# Patient Record
Sex: FEMALE | URBAN - METROPOLITAN AREA
[De-identification: names, ages, dates, MRNs, and addresses within clinical notes are randomized per-mention and may not be internally consistent; named-entity substitution may affect disease eponyms.]

---

## 2024-08-01 ENCOUNTER — TELEPHONE (OUTPATIENT)
Dept: BARIATRICS/WEIGHT MGMT | Age: 54
End: 2024-08-01

## 2024-08-01 NOTE — TELEPHONE ENCOUNTER
Past bariatric surgery: Yes  What bariatric surgery has patient had:  bypass  What year did patient have surgery: 1997? Dr. Astudillo at [facility].  2nd surgery done in 2017 to do a reverse and couldn't because they found a bezoar. Repaired bezoar and proceeded to reverse. Stomach is no stapled to liver. Ruptured bowel and intestines in process. Dr. Shawn Maldonado in [place], [place] at [facility]  Location and provider of surgery: See above  What kind of complications are you having?  GERD, Nausea, weight Gain and EXTREME pain.  If GERD, have you seen a GI provider to help resolve the GERD issues? Yes Sees GI team [facility]  Have you been taking any medications for GERD? Yes Dexalint 30mg 1-2 times a day  Have you had an Upper GI or an EGD done? Yes last UGI was in 2022?    In a lot of pain and saw the show and would love your help     Has [employer] Medicare and has already checked and can see Dr. Mei.